# Patient Record
Sex: FEMALE | Race: WHITE
[De-identification: names, ages, dates, MRNs, and addresses within clinical notes are randomized per-mention and may not be internally consistent; named-entity substitution may affect disease eponyms.]

---

## 2019-07-15 ENCOUNTER — HOSPITAL ENCOUNTER (EMERGENCY)
Dept: HOSPITAL 10 - E/R | Age: 29
Discharge: HOME | End: 2019-07-15
Payer: COMMERCIAL

## 2019-07-15 ENCOUNTER — HOSPITAL ENCOUNTER (EMERGENCY)
Dept: HOSPITAL 91 - E/R | Age: 29
Discharge: HOME | End: 2019-07-15
Payer: COMMERCIAL

## 2019-07-15 VITALS
HEIGHT: 67 IN | WEIGHT: 216.27 LBS | BODY MASS INDEX: 33.94 KG/M2 | HEIGHT: 67 IN | WEIGHT: 216.27 LBS | BODY MASS INDEX: 33.94 KG/M2

## 2019-07-15 VITALS — RESPIRATION RATE: 17 BRPM | HEART RATE: 73 BPM | SYSTOLIC BLOOD PRESSURE: 159 MMHG | DIASTOLIC BLOOD PRESSURE: 83 MMHG

## 2019-07-15 DIAGNOSIS — S61.432A: Primary | ICD-10-CM
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
## 2019-07-15 PROCEDURE — 99283 EMERGENCY DEPT VISIT LOW MDM: CPT

## 2019-07-15 NOTE — ERD
ER Documentation


Chief Complaint


Chief Complaint





DOG BITE ON LEFT HAND





HPI


29-year-old female, previously healthy, presents the emergency department, 


complaining of a dog bite on her left hand.  That was caused by her own dog, was


unprovoked with a dog has been injured 4 days ago.  The dog has all the vaccines


up-to-date.  The patient denies active bleeding, no distal numbness or tingling.





ROS


All systems reviewed and are negative except as per history of present illness.





Medications


Home Meds


Active Scripts


Bacitracin* (Bacitracin Zinc Oint*) 28.35 Gm Oint, 1 APPLIC TOP QID for 5 Days, 


#1 TUB


   APPLY TO


   Prov:SELINA WYLIE MD         7/15/19


Amoxicillin/Potassium Clav (Amox-Clav 875-125 mg Tablet) 875-125 mg Tab, 1 TAB 


PO BID for 5 Days, #14 TAB


   Prov:SELINA WYLIE MD         7/15/19





PMhx/Soc


Medical and Surgical Hx:  pt denies Medical Hx, pt denies Surgical Hx


Hx Alcohol Use:  No


Hx Substance Use:  No


Hx Tobacco Use:  No


Smoking Status:  Never smoker





FmHx


Family History:  No diabetes, No coronary disease





Physical Exam


Vitals





Vital Signs


  Date      Temp  Pulse  Resp  B/P (MAP)   Pulse Ox  O2          O2 Flow    FiO2


Time                                                 Delivery    Rate


   7/15/19  97.7     73    17      159/83        97


     15:14                          (108)





Physical Exam


Const:   No acute distress


Head:   Atraumatic 


Eyes:    Normal Conjunctiva


ENT:    Normal External Ears, Nose and Mouth.


Neck:               Full range of motion. No meningismus.


Resp:   Clear to auscultation bilaterally


Cardio:   Regular rate and rhythm, no murmurs


Abd:    Soft, non tender, non distended. Normal bowel sounds


Skin:   No petechiae or rashes


Back:   No midline or flank tenderness


Ext:    Left hand with 2 puncture wounds in the dorsum of the first and fourth 


metacarpal area, no active bleeding, no foreign body seen, distal neurovascular 


exam intact no cyanosis, or edema


Neur:   Awake and alert


Psych:    Normal Mood and Affect





Procedures/MDM


Vital signs stable,  Low suspicion for foreign body, tendon laceration, nerve 


damage, cellulitis, erysipelas, abscess.  


Physical examination and clinical presentation consistent most likely with dog 


bite of the left hand without evidence of infection.





Please schedule a follow up appointment with your primary doctor in 2 days for 


wound check.  If the symptoms persist or worsen like severe pain, fever or signs


of infection, return to the hospital immediately





The patient is stable to be treated outpatient and will be discharged home with 


a Rx for antibiotics, some side effects of prescribed medications (headache, 


rash, nausea, vomiting, diarrhea, drowsiness, habituation, bleeding, 


hypertension, interactions with other medications) were reviewed.





Instructions explained and given directly by me to the patient and relatives 


with acknowledgment and demonstrated understanding.





Disclaimer: Inadvertent spelling and grammatical errors are likely due to 


EHR/dictation software use and do not reflect on the overall quality of patient 


care. Also, please note that the electronic time recorded on this note does not 


necessarily reflect the actual time of the patient encounter.





Departure


Diagnosis:  


   Primary Impression:  


   Dog bite of left hand


Condition:  Stable





Additional Instructions:  


Thank you very much for allowing us to participate in your care. 


Your health and safety is our top priority at [facility].


 


The evaluation in the emergency department has been done to rule out an acute 


emergency.  Chronic, non-life-threatening conditions may have not been 


evaluated; therefore, you need to follow up with a primary care provider in the 


next 48h.  If symptoms persist, worsen or new symptoms develop, then patient 


should return to the ED immediately.





Call your primary care doctor TOMORROW for an appointment during the next 2-4 


days and bring all the information provided. 





Have prescriptions filled and follow precisely the directions on the label. 





If the symptoms get worse and your provider is unavailable, return to the 


Emergency Department immediately.











SELINA WYLIE MD      Jul 15, 2019 15:25